# Patient Record
Sex: MALE | Race: WHITE
[De-identification: names, ages, dates, MRNs, and addresses within clinical notes are randomized per-mention and may not be internally consistent; named-entity substitution may affect disease eponyms.]

---

## 2019-04-15 ENCOUNTER — HOSPITAL ENCOUNTER (EMERGENCY)
Dept: HOSPITAL 12 - ER | Age: 24
LOS: 1 days | Discharge: HOME | End: 2019-04-16
Payer: COMMERCIAL

## 2019-04-15 VITALS — WEIGHT: 121 LBS | HEIGHT: 67 IN | BODY MASS INDEX: 18.99 KG/M2

## 2019-04-15 DIAGNOSIS — L02.416: Primary | ICD-10-CM

## 2019-04-15 DIAGNOSIS — F17.200: ICD-10-CM

## 2019-04-15 PROCEDURE — 10060 I&D ABSCESS SIMPLE/SINGLE: CPT

## 2019-04-15 PROCEDURE — 96372 THER/PROPH/DIAG INJ SC/IM: CPT

## 2019-04-15 PROCEDURE — 99284 EMERGENCY DEPT VISIT MOD MDM: CPT

## 2019-04-15 NOTE — NUR
Patient wheeled in by w/c d/t abscess in [facility]. AAOX4. Speech is clear, speaks in 
complete sentences. No neuro deficits. Patient came in with c/o pain in L[facility] d/t 
abscess. Respiratory even and unlabored, no cough no sob. No GI/ distress 
noted.



Patient in bed at lowest position, side rails upx2, call light within 
reach.Fall precautions implemented per protocol.

## 2019-04-16 VITALS — DIASTOLIC BLOOD PRESSURE: 60 MMHG | SYSTOLIC BLOOD PRESSURE: 110 MMHG

## 2019-04-16 NOTE — NUR
Patient discharged to home in stable conditon.  Written and verbal after care 
instructions given. 

Patient verbalizes understanding of instructions. Patient wheeled out of dept 
in stable condition.